# Patient Record
Sex: MALE | Race: WHITE | NOT HISPANIC OR LATINO | Employment: OTHER | URBAN - METROPOLITAN AREA
[De-identification: names, ages, dates, MRNs, and addresses within clinical notes are randomized per-mention and may not be internally consistent; named-entity substitution may affect disease eponyms.]

---

## 2021-03-03 ENCOUNTER — IMMUNIZATION (OUTPATIENT)
Dept: NURSING | Facility: CLINIC | Age: 71
End: 2021-03-03
Payer: MEDICARE

## 2021-03-03 PROCEDURE — 91301 PR COVID VAC MODERNA 100 MCG/0.5 ML IM: CPT

## 2021-03-03 PROCEDURE — 0011A PR COVID VAC MODERNA 100 MCG/0.5 ML IM: CPT

## 2021-03-31 ENCOUNTER — IMMUNIZATION (OUTPATIENT)
Dept: NURSING | Facility: CLINIC | Age: 71
End: 2021-03-31
Attending: INTERNAL MEDICINE
Payer: MEDICARE

## 2021-03-31 PROCEDURE — 0012A PR COVID VAC MODERNA 100 MCG/0.5 ML IM: CPT

## 2021-03-31 PROCEDURE — 91301 PR COVID VAC MODERNA 100 MCG/0.5 ML IM: CPT

## 2024-06-17 ENCOUNTER — APPOINTMENT (OUTPATIENT)
Dept: CT IMAGING | Facility: CLINIC | Age: 74
End: 2024-06-17
Attending: EMERGENCY MEDICINE
Payer: MEDICARE

## 2024-06-17 ENCOUNTER — HOSPITAL ENCOUNTER (EMERGENCY)
Facility: CLINIC | Age: 74
Discharge: HOME | End: 2024-06-17
Attending: EMERGENCY MEDICINE | Admitting: EMERGENCY MEDICINE
Payer: MEDICARE

## 2024-06-17 VITALS
TEMPERATURE: 97.5 F | SYSTOLIC BLOOD PRESSURE: 169 MMHG | HEART RATE: 86 BPM | BODY MASS INDEX: 20.28 KG/M2 | DIASTOLIC BLOOD PRESSURE: 108 MMHG | OXYGEN SATURATION: 95 % | WEIGHT: 153 LBS | HEIGHT: 73 IN | RESPIRATION RATE: 18 BRPM

## 2024-06-17 DIAGNOSIS — R59.9 ENLARGED LYMPH NODE: ICD-10-CM

## 2024-06-17 DIAGNOSIS — R05.9 COUGH, UNSPECIFIED TYPE: ICD-10-CM

## 2024-06-17 DIAGNOSIS — R91.8 PULMONARY NODULES: ICD-10-CM

## 2024-06-17 DIAGNOSIS — R93.89 ABNORMAL CT OF THE CHEST: ICD-10-CM

## 2024-06-17 DIAGNOSIS — E87.1 HYPONATREMIA: ICD-10-CM

## 2024-06-17 DIAGNOSIS — J43.8 OTHER EMPHYSEMA (H): ICD-10-CM

## 2024-06-17 LAB
ANION GAP SERPL CALCULATED.3IONS-SCNC: 13 MMOL/L (ref 7–15)
ATRIAL RATE - MUSE: 87 BPM
BASOPHILS # BLD AUTO: 0.1 10E3/UL (ref 0–0.2)
BASOPHILS NFR BLD AUTO: 1 %
BUN SERPL-MCNC: 12.7 MG/DL (ref 8–23)
CALCIUM SERPL-MCNC: 8.7 MG/DL (ref 8.8–10.2)
CHLORIDE SERPL-SCNC: 93 MMOL/L (ref 98–107)
CREAT SERPL-MCNC: 0.84 MG/DL (ref 0.67–1.17)
DEPRECATED HCO3 PLAS-SCNC: 22 MMOL/L (ref 22–29)
DIASTOLIC BLOOD PRESSURE - MUSE: NORMAL MMHG
EGFRCR SERPLBLD CKD-EPI 2021: >90 ML/MIN/1.73M2
EOSINOPHIL # BLD AUTO: 0.1 10E3/UL (ref 0–0.7)
EOSINOPHIL NFR BLD AUTO: 1 %
ERYTHROCYTE [DISTWIDTH] IN BLOOD BY AUTOMATED COUNT: 12.5 % (ref 10–15)
FLUAV RNA SPEC QL NAA+PROBE: NEGATIVE
FLUBV RNA RESP QL NAA+PROBE: NEGATIVE
GLUCOSE SERPL-MCNC: 98 MG/DL (ref 70–99)
HCT VFR BLD AUTO: 38.5 % (ref 40–53)
HGB BLD-MCNC: 13.3 G/DL (ref 13.3–17.7)
HOLD SPECIMEN: NORMAL
HOLD SPECIMEN: NORMAL
IMM GRANULOCYTES # BLD: 0 10E3/UL
IMM GRANULOCYTES NFR BLD: 0 %
INTERPRETATION ECG - MUSE: NORMAL
LYMPHOCYTES # BLD AUTO: 0.9 10E3/UL (ref 0.8–5.3)
LYMPHOCYTES NFR BLD AUTO: 19 %
MCH RBC QN AUTO: 32.1 PG (ref 26.5–33)
MCHC RBC AUTO-ENTMCNC: 34.5 G/DL (ref 31.5–36.5)
MCV RBC AUTO: 93 FL (ref 78–100)
MONOCYTES # BLD AUTO: 0.7 10E3/UL (ref 0–1.3)
MONOCYTES NFR BLD AUTO: 15 %
NEUTROPHILS # BLD AUTO: 3 10E3/UL (ref 1.6–8.3)
NEUTROPHILS NFR BLD AUTO: 63 %
NRBC # BLD AUTO: 0 10E3/UL
NRBC BLD AUTO-RTO: 0 /100
NT-PROBNP SERPL-MCNC: 670 PG/ML (ref 0–900)
P AXIS - MUSE: 87 DEGREES
PLATELET # BLD AUTO: 198 10E3/UL (ref 150–450)
POTASSIUM SERPL-SCNC: 4.1 MMOL/L (ref 3.4–5.3)
PR INTERVAL - MUSE: 208 MS
QRS DURATION - MUSE: 82 MS
QT - MUSE: 396 MS
QTC - MUSE: 476 MS
R AXIS - MUSE: 51 DEGREES
RBC # BLD AUTO: 4.14 10E6/UL (ref 4.4–5.9)
RSV RNA SPEC NAA+PROBE: NEGATIVE
SARS-COV-2 RNA RESP QL NAA+PROBE: NEGATIVE
SODIUM SERPL-SCNC: 128 MMOL/L (ref 135–145)
SYSTOLIC BLOOD PRESSURE - MUSE: NORMAL MMHG
T AXIS - MUSE: 90 DEGREES
VENTRICULAR RATE- MUSE: 87 BPM
WBC # BLD AUTO: 4.7 10E3/UL (ref 4–11)

## 2024-06-17 PROCEDURE — 82374 ASSAY BLOOD CARBON DIOXIDE: CPT | Performed by: EMERGENCY MEDICINE

## 2024-06-17 PROCEDURE — 99285 EMERGENCY DEPT VISIT HI MDM: CPT | Mod: 25

## 2024-06-17 PROCEDURE — 250N000011 HC RX IP 250 OP 636: Performed by: EMERGENCY MEDICINE

## 2024-06-17 PROCEDURE — G1010 CDSM STANSON: HCPCS

## 2024-06-17 PROCEDURE — 85025 COMPLETE CBC W/AUTO DIFF WBC: CPT | Performed by: EMERGENCY MEDICINE

## 2024-06-17 PROCEDURE — 83880 ASSAY OF NATRIURETIC PEPTIDE: CPT | Performed by: EMERGENCY MEDICINE

## 2024-06-17 PROCEDURE — 87637 SARSCOV2&INF A&B&RSV AMP PRB: CPT | Performed by: EMERGENCY MEDICINE

## 2024-06-17 PROCEDURE — 82565 ASSAY OF CREATININE: CPT | Performed by: EMERGENCY MEDICINE

## 2024-06-17 PROCEDURE — 85004 AUTOMATED DIFF WBC COUNT: CPT | Performed by: EMERGENCY MEDICINE

## 2024-06-17 PROCEDURE — 36415 COLL VENOUS BLD VENIPUNCTURE: CPT | Performed by: EMERGENCY MEDICINE

## 2024-06-17 PROCEDURE — 93005 ELECTROCARDIOGRAM TRACING: CPT

## 2024-06-17 RX ORDER — AZITHROMYCIN 250 MG/1
TABLET, FILM COATED ORAL
Qty: 6 TABLET | Refills: 0 | Status: SHIPPED | OUTPATIENT
Start: 2024-06-17 | End: 2024-06-22

## 2024-06-17 RX ORDER — IOPAMIDOL 755 MG/ML
67 INJECTION, SOLUTION INTRAVASCULAR ONCE
Status: COMPLETED | OUTPATIENT
Start: 2024-06-17 | End: 2024-06-17

## 2024-06-17 RX ADMIN — IOPAMIDOL 67 ML: 755 INJECTION, SOLUTION INTRAVENOUS at 13:29

## 2024-06-17 ASSESSMENT — COLUMBIA-SUICIDE SEVERITY RATING SCALE - C-SSRS
2. HAVE YOU ACTUALLY HAD ANY THOUGHTS OF KILLING YOURSELF IN THE PAST MONTH?: NO
6. HAVE YOU EVER DONE ANYTHING, STARTED TO DO ANYTHING, OR PREPARED TO DO ANYTHING TO END YOUR LIFE?: NO
1. IN THE PAST MONTH, HAVE YOU WISHED YOU WERE DEAD OR WISHED YOU COULD GO TO SLEEP AND NOT WAKE UP?: NO

## 2024-06-17 ASSESSMENT — ACTIVITIES OF DAILY LIVING (ADL)
ADLS_ACUITY_SCORE: 33
ADLS_ACUITY_SCORE: 35
ADLS_ACUITY_SCORE: 35

## 2024-06-17 NOTE — ED TRIAGE NOTES
"Went into UC yesterday. Having cough, chills, and phlem. Xray done. \"Anomaly\" was found so pt was referred here for CT scan. SOB on exertion. Denies CP.         "

## 2024-06-17 NOTE — DISCHARGE INSTRUCTIONS

## 2024-06-17 NOTE — ED PROVIDER NOTES
"  Emergency Department Note      History of Present Illness     Chief Complaint  Cough    HPI  Jose De La Garza Jr. is a 73 year old male who presents via private vehicle for evaluation of recent cough and cold sx and mild SOB. He went to [facility] in  yesterday. Sx onset 3-4 days ago. Mild productive cough. Runny nose. No fever just chills. Having some body aches. No vomiting or diarrhea. NO h/o asthma or COPD but had 50 years of smoking cigars mostly.       Review of External Notes  Clinic visit from 6/16/24- SOB and cough. Recent admit for UTI.     XR Chest B Read 2 views  Order: [id number]  Impression    Diffuse micronodular opacities throughout the lungs which are indeterminate and warrant a CT of the chest for further characterization. Hyperinflation. Scarring or atelectasis in the right lower lobe. Aortic calcification. Normal heart size and pulmonary vasculature. No pneumothorax or pleural effusion. Surgical clips overlying the right upper chest.    CT Chest < 1 Month  Narrative    For Patients: As a result of the 21st Century Cures Act, medical imaging exams and procedure reports are released immediately into your electronic medical record. You may view this report before your referring provider. If you have questions, please contact your health care provider.    EXAM: XR CHEST 2 VIEWS PA AND LATERAL  LOCATION: [facility]  DATE: 6/16/2024    INDICATION: Sob (shortness Of Breath)  COMPARISON: 1/10/12  Past Medical History   Medical History and Problem List  Palmar fascial fibromatosis  Other specified diseases of anus and rectum    Medications  No current medications.    Surgical History   Undescended testicle exploration.  Physical Exam   Patient Vitals for the past 24 hrs:   BP Temp Temp src Pulse Resp SpO2 Height Weight   06/17/24 1416 (!) 169/108 -- -- 86 18 95 % -- --   06/17/24 1051 (!) 139/100 97.5  F (36.4  C) Temporal 96 16 96 % 1.854 m (6' 1\") 69.4 kg (153 lb)     Physical Exam  VS: " Reviewed per above  HENT: Mucous membranes moist  EYES: sclera anicteric  CV: Rate as noted,  regular rhythm.   RESP: Effort normal. Breath sounds are normal bilaterally.  NEURO: Alert, moving all extremities  MSK: No deformity of the extremities  SKIN: Warm and dry    Diagnostics   Lab Results   Labs Ordered and Resulted from Time of ED Arrival to Time of ED Departure   BASIC METABOLIC PANEL - Abnormal       Result Value    Sodium 128 (*)     Potassium 4.1      Chloride 93 (*)     Carbon Dioxide (CO2) 22      Anion Gap 13      Urea Nitrogen 12.7      Creatinine 0.84      GFR Estimate >90      Calcium 8.7 (*)     Glucose 98     CBC WITH PLATELETS AND DIFFERENTIAL - Abnormal    WBC Count 4.7      RBC Count 4.14 (*)     Hemoglobin 13.3      Hematocrit 38.5 (*)     MCV 93      MCH 32.1      MCHC 34.5      RDW 12.5      Platelet Count 198      % Neutrophils 63      % Lymphocytes 19      % Monocytes 15      % Eosinophils 1      % Basophils 1      % Immature Granulocytes 0      NRBCs per 100 WBC 0      Absolute Neutrophils 3.0      Absolute Lymphocytes 0.9      Absolute Monocytes 0.7      Absolute Eosinophils 0.1      Absolute Basophils 0.1      Absolute Immature Granulocytes 0.0      Absolute NRBCs 0.0     INFLUENZA A/B, RSV, & SARS-COV2 PCR - Normal    Influenza A PCR Negative      Influenza B PCR Negative      RSV PCR Negative      SARS CoV2 PCR Negative     NT PROBNP INPATIENT - Normal    N terminal Pro BNP Inpatient 670         Imaging  CT Chest Pulmonary Embolism w Contrast   Preliminary Result   IMPRESSION:   1.  No convincing pulmonary embolus.   2.  Innumerable bilateral pulmonary nodules, most of which are   calcified and likely sequelae of prior granulomatous disease.   3.  Indeterminate left lower lobe pulmonary nodules measuring up to 6   mm. Recommend follow-up per Fleischner criteria (below).   4.  Enlarged subcarinal lymph node may be reactive. Attention on   follow-up.   5.  Right asymmetric subareolar  soft tissue, possibly mild asymmetric   gynecomastia. Attention on follow-up.      REFERENCE:   Guidelines for Management of Incidental Pulmonary Nodules Detected on   CT Images: From the Fleischner Society 2017.    Guidelines apply to incidental nodules in patients who are 35 years or   older.   Guidelines do not apply to lung cancer screening, patients with   immunosuppression, or patients with known primary cancer.      MULTIPLE NODULES   Nodule size 6 mm or larger   Low-risk patients: Follow-up CT at 3-6 months, then consider CT at   18-24 months.   High-risk patients: Follow-up CT at 3-6 months, then at 18-24 months   if no change.   -Use most suspicious nodule as guide to management.          EKG     ECG results from 06/17/24   EKG 12-lead, tracing only     Value    Systolic Blood Pressure     Diastolic Blood Pressure     Ventricular Rate 87    Atrial Rate 87    CT Interval 208    QRS Duration 82        QTc 476    P Axis 87    R AXIS 51    T Axis 90    Interpretation ECG      Sinus rhythm  Possible Left atrial enlargement  Left ventricular hypertrophy with repolarization abnormality ( [place] product )  Possible Inferior infarct , age undetermined  Possible Anterior infarct , age undetermined  Abnormal ECG  No previous ECGs available           ED Course    Medications Administered  Medications   iopamidol (ISOVUE-370) solution 67 mL (67 mLs Intravenous $Given 6/17/24 1329)   sodium chloride (PF) 0.9% PF flush 76 mL (76 mLs Intravenous $Given 6/17/24 1329)       Procedures  None    Discussion of Management  None    Social Determinants of Health adding to complexity of care  None    ED Course  ED Course as of 06/17/24 1436   Mon Jun 17, 2024   1417 I rechecked with the patient.     Medical Decision Making / Diagnosis   CMS Diagnoses: None    MIPS    CT for PE was ordered because the patient is high risk for pulmonary embolism.    LISY De La Garza Jr. is a 73 year old male who presents to the ER for  evaluation of cough and mild shortness of breath and abnormal chest x-ray.  On arrival he has mild hypertension.  Lungs are clear to auscultation.  Reviewed outpatient chest x-ray with diffuse micronodular opacities in the lungs of unclear significance.  CT of the chest obtained today does not show PE but does demonstrate innumerable bilateral pulmonary nodules, which radiology felt was likely secondary to previous granulomatous disease.  Incidental findings of gynecomastia and subcarinal lymphadenopathy was relayed to patient.  He does have emphysema changes and with recent cough, plan for course of azithromycin.  He declines albuterol inhaler prescription.  Labs do show hyponatremia at 128.  Possibly related to poor p.o. intake recently.  Recommended close primary care follow-up to reassess symptoms and sodium level and follow-up on incidental findings.  Return precautions discussed.    Disposition  The patient was discharged.     ICD-10 Codes:    ICD-10-CM    1. Other emphysema (H)  J43.8       2. Cough, unspecified type  R05.9       3. Pulmonary nodules  R91.8       4. Enlarged lymph node  R59.9     seen on chest CT      5. Hyponatremia  E87.1            Discharge Medications  New Prescriptions    AZITHROMYCIN (ZITHROMAX) 250 MG TABLET    Take 2 tablets (500 mg) by mouth daily for 1 day, THEN 1 tablet (250 mg) daily for 4 days.         Scribe Disclosure:  I, Lucia Salcido, am serving as a scribe at 2:32 PM on 6/17/2024 to document services personally performed by Mathieu Saleh MD based on my observations and the provider's statements to me.        Mathieu Saleh MD  06/17/24 1932

## 2024-12-27 ENCOUNTER — HOSPITAL ENCOUNTER (EMERGENCY)
Facility: CLINIC | Age: 74
Discharge: HOME | End: 2024-12-27
Attending: EMERGENCY MEDICINE | Admitting: EMERGENCY MEDICINE
Payer: MEDICARE

## 2024-12-27 VITALS
OXYGEN SATURATION: 99 % | RESPIRATION RATE: 18 BRPM | BODY MASS INDEX: 19.31 KG/M2 | WEIGHT: 145.72 LBS | TEMPERATURE: 98.3 F | SYSTOLIC BLOOD PRESSURE: 173 MMHG | DIASTOLIC BLOOD PRESSURE: 112 MMHG | HEIGHT: 73 IN | HEART RATE: 85 BPM

## 2024-12-27 DIAGNOSIS — J01.90 ACUTE SINUSITIS, RECURRENCE NOT SPECIFIED, UNSPECIFIED LOCATION: ICD-10-CM

## 2024-12-27 LAB
FLUAV RNA SPEC QL NAA+PROBE: NEGATIVE
FLUBV RNA RESP QL NAA+PROBE: NEGATIVE
RSV RNA SPEC NAA+PROBE: NEGATIVE
SARS-COV-2 RNA RESP QL NAA+PROBE: NEGATIVE

## 2024-12-27 PROCEDURE — 99283 EMERGENCY DEPT VISIT LOW MDM: CPT

## 2024-12-27 PROCEDURE — 87637 SARSCOV2&INF A&B&RSV AMP PRB: CPT | Performed by: EMERGENCY MEDICINE

## 2024-12-27 RX ORDER — OXYMETAZOLINE HYDROCHLORIDE 0.05 G/100ML
2 SPRAY NASAL 2 TIMES DAILY
Qty: 1 ML | Refills: 0 | Status: SHIPPED | OUTPATIENT
Start: 2024-12-27 | End: 2024-12-30

## 2024-12-27 RX ORDER — GUAIFENESIN 600 MG/1
1200 TABLET, EXTENDED RELEASE ORAL 2 TIMES DAILY
Qty: 40 TABLET | Refills: 0 | Status: SHIPPED | OUTPATIENT
Start: 2024-12-27 | End: 2025-01-06

## 2024-12-27 ASSESSMENT — COLUMBIA-SUICIDE SEVERITY RATING SCALE - C-SSRS
6. HAVE YOU EVER DONE ANYTHING, STARTED TO DO ANYTHING, OR PREPARED TO DO ANYTHING TO END YOUR LIFE?: NO
1. IN THE PAST MONTH, HAVE YOU WISHED YOU WERE DEAD OR WISHED YOU COULD GO TO SLEEP AND NOT WAKE UP?: NO
2. HAVE YOU ACTUALLY HAD ANY THOUGHTS OF KILLING YOURSELF IN THE PAST MONTH?: NO

## 2024-12-27 ASSESSMENT — ACTIVITIES OF DAILY LIVING (ADL): ADLS_ACUITY_SCORE: 41

## 2024-12-27 NOTE — ED PROVIDER NOTES
"  Emergency Department Note      History of Present Illness     Chief Complaint   Flu Symptoms      HPI   Jose De La Garza Jr. is a 74 year old male presenting to the ED with flu symptoms. The patient reports ongoing body aches, rhinorrhea, nasal/sinus congestion chills for the past 2 weeks. The patient was diagnosed with emphysema during the summer for which he uses a trilogy inhaler. He states that he visits his wife at her nursing home daily and is concerned with making her sick. He has not taken other medications to manage his symptoms. The patient denies fevers, chest pain, sore throat, cough, headaches. He had not had a cold for 4-5 years.    Independent Historian   None    Review of External Notes   None    Past Medical History     Medical History and Problem List   Palmar fascial fibromatosis  Other specified diseases of anus and rectum  Emphysema    Medications   Fluticasone     Surgical History   Left undescended testicle exploration     Physical Exam     Patient Vitals for the past 24 hrs:   BP Temp Temp src Pulse Resp SpO2 Height Weight   12/27/24 1059 (!) 173/112 -- -- 85 18 99 % -- --   12/27/24 0826 (!) 168/96 98.3  F (36.8  C) Oral 95 18 97 % 1.854 m (6' 1\") 66.1 kg (145 lb 11.6 oz)     Physical Exam  General: Alert, no acute distress; well appearing. Nasal congestion.  Surgical mask on.   HEENT:  Moist mucous membranes.  Posterior oropharynx clear, no exudates.  Conjunctiva normal. TMs clear bilaterally  CV:  RRR, no m/r/g, skin warm and well perfused  Pulm:  CTAB, no wheezes/ronchi/rales.  No acute distress, breathing comfortably  GI:  Soft, nontender, nondistended.  No rebound or guarding.   MSK:  Moving all extremities.  No focal areas of edema, erythema  Skin:  WWP, no rashes    Diagnostics     Lab Results   Labs Ordered and Resulted from Time of ED Arrival to Time of ED Departure   INFLUENZA A/B, RSV AND SARS-COV2 PCR - Normal       Result Value    Influenza A PCR Negative      Influenza B PCR " Negative      RSV PCR Negative      SARS CoV2 PCR Negative         Imaging   No orders to display     Independent Interpretation   None    ED Course      Medications Administered   Medications - No data to display    Procedures   Procedures     Discussion of Management   None    ED Course   ED Course as of 12/27/24 1732   Fri Dec 27, 2024   1046 I obtained the history and examined the patient as noted above.         Additional Documentation  None    Medical Decision Making / Diagnosis     CMS Diagnoses: None    MIPS      Antibiotics were prescribed for sinusitis because the patient has had symptoms for more than 10 days.       LISY De La Garza Jr. is a 74 year old male with history of emphysema who presents to the emergency department for evaluation of 2 weeks of URI symptoms.  Patient is afebrile, hemodynamically stable and well-appearing.  Lung fields are clear and he is not hypoxic.  He denies chest pain.  Limited viral testing here is negative.  No signs of OM/OE, pharyngitis or deep space neck infection, pneumonia.  Given the length of protracted symptoms, I am concerned for bacterial sinusitis.  Given his well appearance, doubt serious bacterial illness including meningitis or sepsis.  With reasonable clinical certainty, I do feel that the patient is safe to discharge home on oral antibiotics and decongestants.  Recommend close follow-up with primary care doctor next week to ensure resolution of symptoms.  Patient is comfortable with the overall plan.  Discussed signs/symptoms that should prompt the patient's urgent return to the emergency department.  All questions were answered prior to discharge    Disposition   The patient was discharged.     Diagnosis     ICD-10-CM    1. Acute sinusitis, recurrence not specified, unspecified location  J01.90            Discharge Medications   Discharge Medication List as of 12/27/2024 10:57 AM        START taking these medications    Details   guaiFENesin (MUCINEX)  600 MG 12 hr tablet Take 2 tablets (1,200 mg) by mouth 2 times daily for 10 days., Disp-40 tablet, R-0, E-Prescribe      oxymetazoline (AFRIN NASAL SPRAY) 0.05 % nasal spray Spray 0.2 mLs (2 sprays) in nostril 2 times daily for 3 days., Disp-1 mL, R-0, E-PrescribeNo drip 12 hour formula please           Scribe Disclosure:  [place], Rosetta Vallecillo, am serving as a scribe at 10:51 AM on 12/27/2024 to document services personally performed by Raul Diallo MD based on my observations and the provider's statements to me.        Raul Diallo MD  12/27/24 0181

## 2024-12-27 NOTE — DISCHARGE INSTRUCTIONS
Discharge Instructions  Sinus Infection    You have acute sinusitis, or an infection of the sinuses. The sinuses are the hollow areas within the facial bones that are connected to the nasal opening. The most common cause of acute sinusitis is a virus infection associated with the common cold. Bacterial sinusitis occurs much less commonly, usually as a complication of viral sinusitis. Experts say that most sinusitis is caused by a virus within the first 7-10 days of illness. Antibiotics do nothing to help with virus infections, so most people do not need antibiotics for acute sinusitis.     Generally, every Emergency Department visit should have a follow-up clinic visit with either a primary or a specialty clinic/provider. Please follow-up as instructed by your emergency provider today.    Return to the Emergency Department if:  Your vision changes.  You are confused or have difficulty thinking clearly.  You have swelling around your eye.  You develop a severe headache or neck stiffness.  Your symptoms get worse and you are unable to see your primary provider.      Treatment:  Pain relief -- Non-prescription pain medications, such as Tylenol  (acetaminophen) or Motrin  or Advil  (ibuprofen) are recommended for pain.  Do not use a medicine that you are allergic to, or if your provider has told you not to use it.     Nasal irrigation -- Flushing the nose and sinuses with a saline solution several times per day can help to decrease pain caused by congestion.  Nasal decongestants -- Nasal decongestant sprays, including Afrin  (oxymetazoline) and Nam-Synephrine  (phenylephrine) can be used to temporarily treat congestion. However, these sprays should not be used for more than two to three days due to the risk of rebound congestion (when the nose is congested constantly unless the medication is used repeatedly).  Nasal glucocorticoids -- These are prescription steroids delivered by a nasal spray that can help to reduce  swelling inside the nose, usually within two to three days. These drugs have few side effects and dramatically relieve symptoms in most people.  If you use these in conjunction with Afrin  you will need to use at least 15 minutes prior to the nasal decongestant.    Antibiotic? -- Rarely antibiotics are used along with the above treatments.    If you were given a prescription for medicine here today, be sure to read all of the information (including the package insert) that comes with your prescription.  This will include important information about the medicine, its side effects, and any warnings that you need to know about.  The pharmacist who fills the prescription can provide more information and answer questions you may have about the medicine.  If you have questions or concerns that the pharmacist cannot address, please call or return to the Emergency Department.   Remember that you can always come back to the Emergency Department if you are not able to see your regular provider in the amount of time listed above, if you get any new symptoms, or if there is anything that worries you.

## 2024-12-27 NOTE — ED TRIAGE NOTES
Patient reports 2 weeks of ongoing flu symptoms including body aches, runny nose, and chills.  Patient states he has had his vaccines for the year.      Triage Assessment (Adult)       Row Name 12/27/24 0825          Triage Assessment    Airway WDL WDL        Respiratory WDL    Respiratory WDL WDL        Skin Circulation/Temperature WDL    Skin Circulation/Temperature WDL WDL        Cardiac WDL    Cardiac WDL WDL        Peripheral/Neurovascular WDL    Peripheral Neurovascular WDL WDL        Cognitive/Neuro/Behavioral WDL    Cognitive/Neuro/Behavioral WDL WDL